# Patient Record
Sex: MALE | Race: WHITE | NOT HISPANIC OR LATINO | Employment: UNEMPLOYED | URBAN - METROPOLITAN AREA
[De-identification: names, ages, dates, MRNs, and addresses within clinical notes are randomized per-mention and may not be internally consistent; named-entity substitution may affect disease eponyms.]

---

## 2024-01-03 ENCOUNTER — OFFICE VISIT (OUTPATIENT)
Dept: ORTHOPEDIC SURGERY | Facility: CLINIC | Age: 34
End: 2024-01-03
Payer: COMMERCIAL

## 2024-01-03 VITALS
HEIGHT: 68 IN | BODY MASS INDEX: 27.16 KG/M2 | SYSTOLIC BLOOD PRESSURE: 130 MMHG | WEIGHT: 179.2 LBS | DIASTOLIC BLOOD PRESSURE: 84 MMHG

## 2024-01-03 DIAGNOSIS — M89.8X1 PAIN OF RIGHT SCAPULA: Primary | ICD-10-CM

## 2024-01-03 PROCEDURE — 99204 OFFICE O/P NEW MOD 45 MIN: CPT | Performed by: ORTHOPAEDIC SURGERY

## 2024-01-03 RX ORDER — NAPROXEN 500 MG/1
500 TABLET ORAL AS NEEDED
COMMUNITY
Start: 2023-12-29 | End: 2024-01-03

## 2024-01-03 RX ORDER — MELOXICAM 15 MG/1
TABLET ORAL
Qty: 90 TABLET | Refills: 2 | Status: SHIPPED | OUTPATIENT
Start: 2024-01-03

## 2024-01-03 NOTE — PROGRESS NOTES
"    [facility] [place] [facility] Note        Subjective     Pain of the Right Shoulder      HPI    Josh Macdonald is a 33 y.o. male.  He has pain in his right shoulder blade that started in January.  He says he overdid it at work.  He last worked in April.  He saw chiropractor.  He had MRI of his thoracic spine.  He was told he had a bulging disc.  He is right-hand dominant.  He does factory work.  Naprosyn has not helped    History reviewed. No pertinent past medical history.   History reviewed. No pertinent surgical history.   History reviewed. No pertinent family history.  Social History     Socioeconomic History   • Marital status: Single   Tobacco Use   • Smoking status: Never   • Smokeless tobacco: Never   Vaping Use   • Vaping Use: Never used   Substance and Sexual Activity   • Alcohol use: Yes     Comment: occasionally   • Drug use: Never   • Sexual activity: Yes      Current Outpatient Medications on File Prior to Visit   Medication Sig Dispense Refill   • [DISCONTINUED] naproxen (NAPROSYN) 500 MG tablet 1 tablet As Needed.       No current facility-administered medications on file prior to visit.      No Known Allergies       Review of Systems   Constitutional: Negative.    HENT: Negative.     Eyes: Negative.    Respiratory: Negative.     Cardiovascular: Negative.    Gastrointestinal: Negative.    Endocrine: Negative.    Genitourinary: Negative.    Musculoskeletal:  Positive for arthralgias.   Skin: Negative.    Allergic/Immunologic: Negative.    Neurological: Negative.    Hematological: Negative.    Psychiatric/Behavioral: Negative.          I reviewed the patient's chief complaint, history of present illness, review of systems, past medical history, surgical history, family history, social history, medications and allergy list.        Objective      Physical Exam  /84   Ht 172.7 cm (68\")   Wt 81.3 kg (179 lb 3.2 oz)   BMI 27.25 kg/m²     Body mass index is 27.25 kg/m².    General  Mental " Status - alert  General Appearance - cooperative, well groomed, not in acute distress  Orientation - Oriented X3  Build & Nutrition - well developed and well nourished  Posture - normal posture  Gait - normal gait       Ortho Exam  Right shoulder full motion full-strength.  Tender inferior border of the scapula.  Negative impingement.    Imaging/Studies  Imaging Results (Last 24 Hours)       ** No results found for the last 24 hours. **          I viewed and personally interpreted MRI from December 15 which shows a questionable posterior inferior labral tear with rotator cuff tendinosis    Assessment    Assessment:  1. Pain of right scapula        Plan:  Based upon where his pain is on his scapula he does not have a symptomatic labral tear.  No surgical indication.  I recommended physical therapy and have ordered Mobic to take as a different anti-inflammatory.  I will see him back in 4 weeks.        Oz Cade MD  01/03/24  14:34 EST      Dictated Utilizing Dragon Dictation.

## 2024-01-09 ENCOUNTER — TELEPHONE (OUTPATIENT)
Dept: ORTHOPEDIC SURGERY | Facility: CLINIC | Age: 34
End: 2024-01-09

## 2024-01-09 NOTE — TELEPHONE ENCOUNTER
The [facility] received a fax that requires your attention. The document has been indexed to the patient’s chart for your review.      Reason for sending: RECEIVED RECORDS REQUEST FROM [employer]    Documents Description: RECORDS REQUEST-[employer] [facility]-1/8/2024    Name of Sender: DR. DYER    Date Indexed: 1/9/2024

## 2024-03-21 ENCOUNTER — TELEPHONE (OUTPATIENT)
Dept: ORTHOPEDIC SURGERY | Facility: CLINIC | Age: 34
End: 2024-03-21
Payer: COMMERCIAL

## 2024-03-21 DIAGNOSIS — M89.8X1 PAIN OF RIGHT SCAPULA: Primary | ICD-10-CM

## 2024-03-21 NOTE — TELEPHONE ENCOUNTER
Patient called and said his Physical therapy has been helping with his injury but he needs more time. So he requesting a new pt order to extend his time.     Please advise.

## 2024-11-19 ENCOUNTER — TELEPHONE (OUTPATIENT)
Dept: ORTHOPEDIC SURGERY | Facility: CLINIC | Age: 34
End: 2024-11-19
Payer: COMMERCIAL

## 2024-11-19 NOTE — TELEPHONE ENCOUNTER
PATIENT IS CALLING IN REQUESTING A REFILL ON THE MELOXICAM. HE HAS BEEN TAKING IT FOR HIS RIGHT SHOULDER. HE WAS DOING PT, BUT RAN OUT OF VISITS. HE STATES THAT IT WAS HELPING BUT HE WAS NOT A [employer]. HE STATES THAT THE MELOXICAM WAS HELPING. HE IS PLANNING TO CALL AND MAKE AN APPOINTMENT LATER THIS MONTH OR NEXT, BUT STATES THAT HE WAS HOPING HE COULD GET A REFILL UNTIL THEN.    CALL BACK # [phone]    PHARMACY - [facility].

## 2024-11-19 NOTE — TELEPHONE ENCOUNTER
Patient has only been seen once by Dr. Cade. His first/last appointment was Jan. 3rd, 2024. I advised patient that if he would like a refill of meloxicam, he would need to be seen by our office or request the medication from his PCP.    Patient stated he would call back and make an appointment at a mutually convenient date/time. I educated him on our new [place] location where Dr. Cade practices on Wednesdays. Patient was thankful for the information.    No further actions taken at this time.    Iraida Montalvo MA